# Patient Record
Sex: FEMALE | Race: BLACK OR AFRICAN AMERICAN | NOT HISPANIC OR LATINO | Employment: STUDENT | URBAN - METROPOLITAN AREA
[De-identification: names, ages, dates, MRNs, and addresses within clinical notes are randomized per-mention and may not be internally consistent; named-entity substitution may affect disease eponyms.]

---

## 2024-07-10 ENCOUNTER — HOSPITAL ENCOUNTER (EMERGENCY)
Facility: HOSPITAL | Age: 19
Discharge: HOME | End: 2024-07-10

## 2024-07-10 VITALS
DIASTOLIC BLOOD PRESSURE: 65 MMHG | WEIGHT: 95 LBS | OXYGEN SATURATION: 99 % | HEART RATE: 80 BPM | RESPIRATION RATE: 16 BRPM | SYSTOLIC BLOOD PRESSURE: 110 MMHG

## 2024-07-10 DIAGNOSIS — R04.0 EPISTAXIS: Primary | ICD-10-CM

## 2024-07-10 PROCEDURE — 99282 EMERGENCY DEPT VISIT SF MDM: CPT

## 2024-07-10 PROCEDURE — 2500000001 HC RX 250 WO HCPCS SELF ADMINISTERED DRUGS (ALT 637 FOR MEDICARE OP): Performed by: PHYSICIAN ASSISTANT

## 2024-07-10 RX ORDER — OXYMETAZOLINE HCL 0.05 %
2 SPRAY, NON-AEROSOL (ML) NASAL ONCE
Status: COMPLETED | OUTPATIENT
Start: 2024-07-10 | End: 2024-07-10

## 2024-07-10 RX ORDER — OXYMETAZOLINE HCL 0.05 %
2 SPRAY, NON-AEROSOL (ML) NASAL EVERY 12 HOURS PRN
Qty: 30 ML | Refills: 0 | Status: SHIPPED | OUTPATIENT
Start: 2024-07-10 | End: 2024-07-13

## 2024-07-10 ASSESSMENT — PAIN - FUNCTIONAL ASSESSMENT: PAIN_FUNCTIONAL_ASSESSMENT: 0-10

## 2024-07-10 ASSESSMENT — COLUMBIA-SUICIDE SEVERITY RATING SCALE - C-SSRS
6. HAVE YOU EVER DONE ANYTHING, STARTED TO DO ANYTHING, OR PREPARED TO DO ANYTHING TO END YOUR LIFE?: NO
1. IN THE PAST MONTH, HAVE YOU WISHED YOU WERE DEAD OR WISHED YOU COULD GO TO SLEEP AND NOT WAKE UP?: NO
2. HAVE YOU ACTUALLY HAD ANY THOUGHTS OF KILLING YOURSELF?: NO

## 2024-07-10 ASSESSMENT — PAIN SCALES - GENERAL: PAINLEVEL_OUTOF10: 0 - NO PAIN

## 2024-07-10 NOTE — ED PROVIDER NOTES
HPI   Chief Complaint   Patient presents with    Epistaxis (Nose Bleed)       18-year-old female with bleeding left nares began less than 1 hour ago.  Has nasal clip on with no active bleeding on my initial evaluation.  No trauma or injury.  She feels fine otherwise.  No routine medication.  No allergies.      History provided by:  Patient   used: No                        No data recorded                   Patient History   No past medical history on file.  No past surgical history on file.  No family history on file.  Social History     Tobacco Use    Smoking status: Not on file    Smokeless tobacco: Not on file   Substance Use Topics    Alcohol use: Not on file    Drug use: Not on file       Physical Exam   ED Triage Vitals [07/10/24 1839]   Temp Heart Rate Respirations BP   -- 81 16 110/65      Pulse Ox Temp src Heart Rate Source Patient Position   100 % -- Monitor --      BP Location FiO2 (%)     -- --       Physical Exam  Vitals and nursing note reviewed.   Constitutional:       General: She is not in acute distress.     Appearance: Normal appearance. She is normal weight. She is not ill-appearing, toxic-appearing or diaphoretic.   HENT:      Head: Normocephalic and atraumatic.      Nose: Nose normal.      Comments: No active bleeding on initial eval after nasal clip and been applied in triage.  Does have some old blood left nares.  Posterior pharynx clear with no pharyngeal bleeding.  Right nares normal.     Mouth/Throat:      Mouth: Mucous membranes are moist.      Pharynx: Oropharynx is clear.   Eyes:      Extraocular Movements: Extraocular movements intact.      Conjunctiva/sclera: Conjunctivae normal.      Pupils: Pupils are equal, round, and reactive to light.   Cardiovascular:      Rate and Rhythm: Normal rate.   Pulmonary:      Effort: Pulmonary effort is normal.   Musculoskeletal:         General: Normal range of motion.   Skin:     General: Skin is warm and dry.   Neurological:       General: No focal deficit present.      Mental Status: She is alert and oriented to person, place, and time.   Psychiatric:         Mood and Affect: Mood normal.         Behavior: Behavior normal.         Thought Content: Thought content normal.         Judgment: Judgment normal.         ED Course & MDM   Diagnoses as of 07/10/24 2019   Epistaxis       Medical Decision Making  Afrin nasal spray  2015: Bleeding resolved.  Nares patent.  No bleeding.  Posterior pharynx clear no bleeding.    Anterior epistaxis resolved.    Stable for discharge outpatient management follow-up.  Use of saline and Afrin nasal spray.  Direct pressure bleeding recurs.  Return to ER as needed.        Procedure  Procedures     Walter Figueroa PA-C  07/10/24 3786

## 2024-07-10 NOTE — ED TRIAGE NOTES
PT PRESENTS TO ED WITH NOSEBLEED THAT STARTED 20  MINUTES AGO. PT DENIES DIZZINESS. PT HAS A HX OF NOSEBLEEDS. PT DENIES USE OF BLOOD THINNERS. NOSE CLAMP APPLIED IN TRIAGE.

## 2024-07-18 ENCOUNTER — APPOINTMENT (OUTPATIENT)
Dept: OBSTETRICS AND GYNECOLOGY | Facility: CLINIC | Age: 19
End: 2024-07-18
Payer: COMMERCIAL

## 2024-08-05 ENCOUNTER — APPOINTMENT (OUTPATIENT)
Dept: OBSTETRICS AND GYNECOLOGY | Facility: CLINIC | Age: 19
End: 2024-08-05
Payer: COMMERCIAL

## 2024-08-05 ENCOUNTER — LAB (OUTPATIENT)
Dept: LAB | Facility: LAB | Age: 19
End: 2024-08-05

## 2024-08-05 VITALS
WEIGHT: 126 LBS | HEIGHT: 64 IN | BODY MASS INDEX: 21.51 KG/M2 | DIASTOLIC BLOOD PRESSURE: 62 MMHG | SYSTOLIC BLOOD PRESSURE: 100 MMHG

## 2024-08-05 DIAGNOSIS — Z32.01 PREGNANCY TEST POSITIVE (HHS-HCC): ICD-10-CM

## 2024-08-05 DIAGNOSIS — Z34.01 ENCOUNTER FOR SUPERVISION OF NORMAL FIRST PREGNANCY, FIRST TRIMESTER (HHS-HCC): Primary | ICD-10-CM

## 2024-08-05 DIAGNOSIS — Z3A.11 11 WEEKS GESTATION OF PREGNANCY (HHS-HCC): ICD-10-CM

## 2024-08-05 DIAGNOSIS — Z34.01 ENCOUNTER FOR SUPERVISION OF NORMAL FIRST PREGNANCY, FIRST TRIMESTER (HHS-HCC): ICD-10-CM

## 2024-08-05 LAB
ERYTHROCYTE [DISTWIDTH] IN BLOOD BY AUTOMATED COUNT: 12.8 % (ref 11.5–14.5)
HBV SURFACE AG SERPL QL IA: NONREACTIVE
HCT VFR BLD AUTO: 39.3 % (ref 36–46)
HCV AB SER QL: NONREACTIVE
HGB BLD-MCNC: 12.2 G/DL (ref 12–16)
HIV 1+2 AB+HIV1 P24 AG SERPL QL IA: NONREACTIVE
MCH RBC QN AUTO: 25.7 PG (ref 26–34)
MCHC RBC AUTO-ENTMCNC: 31 G/DL (ref 32–36)
MCV RBC AUTO: 83 FL (ref 80–100)
NRBC BLD-RTO: 0 /100 WBCS (ref 0–0)
PLATELET # BLD AUTO: 316 X10*3/UL (ref 150–450)
PREGNANCY TEST URINE, POC: POSITIVE
RBC # BLD AUTO: 4.74 X10*6/UL (ref 4–5.2)
REFLEX ADDED, ANEMIA PANEL: NORMAL
RUBV IGG SERPL IA-ACNC: 0.6 IA
RUBV IGG SERPL QL IA: NEGATIVE
TREPONEMA PALLIDUM IGG+IGM AB [PRESENCE] IN SERUM OR PLASMA BY IMMUNOASSAY: NONREACTIVE
VARICELLA ZOSTER IGG INDEX: 0.5 IA
VZV IGG SER QL IA: NEGATIVE
WBC # BLD AUTO: 10.9 X10*3/UL (ref 4.4–11.3)

## 2024-08-05 PROCEDURE — 87491 CHLMYD TRACH DNA AMP PROBE: CPT

## 2024-08-05 PROCEDURE — 85027 COMPLETE CBC AUTOMATED: CPT

## 2024-08-05 PROCEDURE — 83021 HEMOGLOBIN CHROMOTOGRAPHY: CPT

## 2024-08-05 PROCEDURE — 86317 IMMUNOASSAY INFECTIOUS AGENT: CPT

## 2024-08-05 PROCEDURE — 86787 VARICELLA-ZOSTER ANTIBODY: CPT

## 2024-08-05 PROCEDURE — 0500F INITIAL PRENATAL CARE VISIT: CPT

## 2024-08-05 PROCEDURE — 81025 URINE PREGNANCY TEST: CPT

## 2024-08-05 PROCEDURE — 86803 HEPATITIS C AB TEST: CPT

## 2024-08-05 PROCEDURE — 87340 HEPATITIS B SURFACE AG IA: CPT

## 2024-08-05 PROCEDURE — 87389 HIV-1 AG W/HIV-1&-2 AB AG IA: CPT

## 2024-08-05 PROCEDURE — 87591 N.GONORRHOEAE DNA AMP PROB: CPT

## 2024-08-05 PROCEDURE — 36415 COLL VENOUS BLD VENIPUNCTURE: CPT

## 2024-08-05 PROCEDURE — 83020 HEMOGLOBIN ELECTROPHORESIS: CPT

## 2024-08-05 PROCEDURE — 86780 TREPONEMA PALLIDUM: CPT

## 2024-08-05 PROCEDURE — 87086 URINE CULTURE/COLONY COUNT: CPT

## 2024-08-05 RX ORDER — NAPROXEN SODIUM 220 MG/1
162 TABLET, FILM COATED ORAL DAILY
Qty: 60 TABLET | Refills: 3 | Status: SHIPPED | OUTPATIENT
Start: 2024-08-05 | End: 2024-11-03

## 2024-08-05 ASSESSMENT — ENCOUNTER SYMPTOMS
HEMATOLOGIC/LYMPHATIC NEGATIVE: 0
CARDIOVASCULAR NEGATIVE: 0
RESPIRATORY NEGATIVE: 0
EYES NEGATIVE: 0
MUSCULOSKELETAL NEGATIVE: 0
ALLERGIC/IMMUNOLOGIC NEGATIVE: 0
PSYCHIATRIC NEGATIVE: 0
CONSTITUTIONAL NEGATIVE: 0
NEUROLOGICAL NEGATIVE: 0
GASTROINTESTINAL NEGATIVE: 0
ENDOCRINE NEGATIVE: 0

## 2024-08-05 ASSESSMENT — EDINBURGH POSTNATAL DEPRESSION SCALE (EPDS)
I HAVE FELT SCARED OR PANICKY FOR NO GOOD REASON: NO, NOT AT ALL
I HAVE LOOKED FORWARD WITH ENJOYMENT TO THINGS: AS MUCH AS I EVER DID
I HAVE BEEN SO UNHAPPY THAT I HAVE BEEN CRYING: NO, NEVER
THINGS HAVE BEEN GETTING ON TOP OF ME: NO, I HAVE BEEN COPING AS WELL AS EVER
I HAVE BLAMED MYSELF UNNECESSARILY WHEN THINGS WENT WRONG: NO, NEVER
I HAVE BEEN SO UNHAPPY THAT I HAVE HAD DIFFICULTY SLEEPING: NOT AT ALL
I HAVE FELT SAD OR MISERABLE: NO, NOT AT ALL
I HAVE BEEN ANXIOUS OR WORRIED FOR NO GOOD REASON: NO, NOT AT ALL
TOTAL SCORE: 0
THE THOUGHT OF HARMING MYSELF HAS OCCURRED TO ME: NEVER
I HAVE BEEN ABLE TO LAUGH AND SEE THE FUNNY SIDE OF THINGS: AS MUCH AS I ALWAYS COULD

## 2024-08-05 NOTE — PROGRESS NOTES
"Wesley Sarkar is a 19 y.o.  at 40iia7l with a working estimated date of delivery of 24. who presents for an initial prenatal visit. This pregnancy is unplanned.    Patient currently experiencing: nausea, desires anti-emetics- patient reports that it's gone away.   Bleeding or cramping since LMP: yes - patient reports occasional spotting with wiping.   Taking prenatal vitamin: Yes  Other concerns today:  Ultrasound completed this pregnancy: Yes - \"by a lady, she didn't tell me anything\"- patient can not recall where the ultrasound was performed.   Last pap: NA  History of hypertension:  No    Patient has trichomonas, patient was told by other provider not to take flagyl-encouraged patient to continue course at this time.   OB History    Para Term  AB Living   1             SAB IAB Ectopic Multiple Live Births                  # Outcome Date GA Lbr Mal/2nd Weight Sex Type Anes PTL Lv   1 Current                History reviewed. No pertinent past medical history.   Past Surgical History:   Procedure Laterality Date    HERNIA REPAIR        Social History     Socioeconomic History    Marital status: Single   Tobacco Use    Smoking status: Never    Smokeless tobacco: Never   Vaping Use    Vaping status: Never Used   Substance and Sexual Activity    Alcohol use: Not Currently    Drug use: Never    Sexual activity: Not Currently       Objective   Physical Exam  Weight: 57.2 kg (126 lb)  TW kg (0 lb)   Pregravid BMI: 21.78  BP: 100/62    General:   Alert and oriented x 3   Heart:  Lungs: Regular rate, rhythm  Clear to auscultation bilaterally   Thyroid: Euthyroid, normal shape and size   Breast: Symmetrical, no skin changes/nipple discharge, redness, tenderness, no masses palpated bilaterally   Abdomen: Soft, non tender   Vulva: EGBUS normal, gravid 10 weeks.    Vagina: Pink, normal discharge   Cervix: No CMT   Uterus: Normal shape, size   Adnexa: NT bilaterally     Assessment "   Problem List Items Addressed This Visit    None  Visit Diagnoses       Encounter for supervision of normal first pregnancy, first trimester ([facility])    -  Primary    Relevant Medications    prenatal vitamin, iron-folic, 27 mg iron-800 mcg folic acid tablet    aspirin 81 mg chewable tablet    Other Relevant Orders    [facility] OB imaging order    CBC Anemia Panel With Reflex,Pregnancy (Completed)    HEMOGLOBIN IDENTIFICATION WITH PATH REVIEW (Completed)    Hepatitis B surface antigen (Completed)    Hepatitis C antibody (Completed)    Rubella Antibody, IgG (Completed)    Syphilis Screen with Reflex (Completed)    HIV 1/2 Antigen/Antibody Screen with Reflex to Confirmation (Completed)    Varicella Zoster Antibody, IgG (Completed)    Urine Culture (Completed)    C. Trachomatis / N. Gonorrhoeae, Amplified Detection (Completed)    Pregnancy test positive ([facility])        Relevant Orders    POCT pregnancy, urine manually resulted (Completed)    11 weeks gestation of pregnancy ([facility])        Relevant Medications    prenatal vitamin, iron-folic, 27 mg iron-800 mcg folic acid tablet    aspirin 81 mg chewable tablet    Other Relevant Orders    [facility] Prenatal Screen             Plan   - New OB resources provided and reviewed with particular attention to dietary, travel, and medication restrictions  - Oriented to practice, CNM vs. MD care  - Reviewed IOM recommendations for weight gain given pt's BMI: 25-35 pounds (BMI 18.5 - 24.9)  - Reviewed bleeding precautions, warning signs, when to call provider; phone number provided  - Discussed bASA for PEC prophylaxis  - Routine NOB labs ordered  - NIPT discussed with patient: patient desires. Pre-test genetic counseling discussed and included:   Interpretation of family and medical histories to assess the probability of disease occurrence or recurrence;   Education about inheritance, genetic testing, disease management, prevention and resources  Counseling to promote  informed choices and adaptation to the risk or presented of a genetic condition  Counseling for psychological aspects of genetic testing.  Plan for trichomonas test of cure at next prenatal visit.   - Discussed Centering Pregnancy with patient, declines at this time  - Nuchal Translucency ultrasound ordered  - Return in 4 weeks for routine prenatal care    EDIN Fung-MELANIE    Things to know in Pregnancy  1.     Avoidance of alcohol, tobacco and drug use  2.     Dietary restrictions reviewed including avoiding raw or poorly cooked meat, lunch meat and soft cheeses  3.    Adequate water intake.  Avoid empty calories with juices  4.    Recommendation for weight gain based on initial BMI (body mass index)  5.    Limit caffeine to less than 200-300 mg/day  6.    Take folic acid 400 mcg daily.  Incorporate 5,000u Vitamin D3 per day.  Discuss Magnesium Supplementation  7.    Importance of good sleep hygiene and avoidance of laying on back after 15 weeks  8.    Encourage daily physical activity of 30 minutes a day the majority of the days of the week  9.    Discussed normal physiologic changes:  Round ligament pain, nausea, breast tenderness  10.  Discussed natural remedies, vitamins and prescription medications for nausea  11.  Baby aspirin 162mg daily (two baby aspirin) for the reduction of pre-eclampsia during pregnancy.  Even if you have never had any blood pressure issues, you can develop hypertension during your pregnancy.  This has been well  Studied and safe to take starting at 12 weeks gestation until after the birth of your baby.    Ultrasound and screening for aneuploidies (Down Syndrome/Trisomy 21, Trisomy 13 + 18)    A requisition has been placed for a dating ultrasound.  Please call to get that scheduled.    At this ultrasound they will ask you if your would like to proceed with screening for genetic disorders that are listed above.  They will do that with an ultrasound at approximately 13 weeks and we  also draw blood work for screening which includes the fetal sex if you desire to know.    Ultrasound for anatomy will be done at 19 weeks.  Based on risk factors and any concerns the maternal fetal medicine provider has, you may need a repeat ultrasound.  Healthy pregnancies that do not have any other concerns by the midwife or maternal fetal medicine do not have any repeat ultrasounds done.      Labs:    1.     An order will be placed for your new ob labs.  Please get those done at the time of your ultrasound.  They will collect         multiple tubes of blood for new ob labs and also urine for STI testing and a urine culture.    2.     If there are any concerns with any blood work or urine testing WE WILL CALL YOU OR COMMUNICATE VIA         [facility]!!!   The biggest concerns our patients have is when they see their complete blood count.  The reference         range in the result is for a non pregnant person!  We will notify you if there is any need to start an iron supplement.    3.    At 26-28 weeks a glucose test is ordered to see if you have gestational diabetes.  We also reassess if you have         Anemia, which can be common in pregnancy    4.    Group B strep culture will be done at 36 weeks gestation.      We also recommend that you be screened once in your life for Cystic Fibrosis and Spinal Muscular Atrophy.  This assesses if we need your partner to be tested if you are a carrier of a gene that can be passed along to your baby.  For this to happen, both parents must be a carrier to the gene.    Choices for care and hospital for birth:    I am a Certified Nurse Midwife and practice in a group setting, which means that any of the midwives in my group practice may be there for your birth.  We care for healthy females during pregnancy and labor/birth.  We practice in collaboration with physicians within our group.  If there are any concerns with your pregnancy, labor or birth our physicians work closely with  "us.      The midwives in our practice strongly encourage you to explore the option of Centering Pregnancy which has been studied for better birth outcomes!  Care will be done in a group setting with 1:1 time with a midwife and then in depth education about every stage of your pregnancy, labor/birth,  care, feeding choices, pediatrician options, birth control and coping techniques for the first few weeks after birth.  We have day groups and our [place] location and a Monday evening group at [facility].  The groups follow your normal prenatal schedule and yes, we keep in contact and I see you at the end of your pregnancy.    There are certain medical conditions that \"risks you out\" of midwifery care that we are constantly assessing for.  Some conditions during your pregnancy that would risk you out of midwifery care would be:    1.     Severe growth restriction of your baby  2.     Labor/Birth  less than 35 weeks  3.     Severe pre-eclampsia at any time during your pregnancy/labor/birth  4.     Gestational Diabetes needing medication (insulin) to control your blood sugars  5.     If you decline or do not complete your glucose testing to rule out diet controlled diabetes by 32 weeks  6.     If you are diagnosed with chronic hypertension during your pregnancy and need to start medication    The options for birth where we provide  Certified Nurse Midwifery coverage with a board certified OB/GYN, in house anesthesia and neonataology coverage are:    [facility] at [place], [place]    To call for questions regarding your care of if you are in labor is [phone]    My nurses name is Sofi Rider who can answer questions and keep me updated should any questions or concerns arise during your pregnancy.  After hours, the answering service will ask you where you intended to give birth and connect you to the midwife on call " "at [facility] or the [facility] at [facility].    If you would like to tour either facility:  Please call the Childbirth education line at [phone]      Danger signs to report:    1.    Seek medical care immediately if you have pain that is doubling you over or vaginal bleeding that is heavier than a  period  2.    Notify the office should you have any burning, urgency, frequency of urination or other concerning symptoms.    Medications that are safe for common discomforts of pregnancy:    1.     Tylenol  2.     Tums or Papaya extract for any upset stomach or heartburn  3.     Zyrtec, Claritin, Benadryl for allergy symptoms  4.     Sudafed or Robitussin for cold symptoms    Work restrictions:    A normal healthy pregnancy without any complications are able to have the standard pregnancy work restrictions which is no pushing/pulling/lifting greater than 25 pounds independently    FMLA paperwork    Can be brought to the office for us to fill out for when you are starting your maternity leave (either your scheduled date of going to the hospital or your due date).  We cannot give out early FMLA when there is no documented medical conditions that are considered \"normal pregnancy\" events.    Comfort measures    1.     Chiropractors are great for alleviation of ligament pain  2.     Yoga is good for your ligaments and mentally preparing for baby and labor.  A prenatal yoga class is recommended.  3.     Prenatal massages are fine  4.     A maternity support belt is an amazing thing that can help ligament pain -- can be purchased on Amazon  5.     Good supportive shoes are key to helping with ligament pain    Dental care    It is very important to see a dentist during your pregnancy for routine cleaning and also if you develop any dental pain during your pregnancy.  Healthy gums and teeth are very important during your pregnancy.  We can provide you with a dental letter if your dentist would like " one.      **IF AT ANYTIME DURING YOUR PREGNANCY YOU HAVE CONCERNS THAT YOU CANNOT AFFORD HEALTHY FOOD PLEASE LET US KNOW!**  We have a Food for Life program and would be happy to place a referral for you.  It is so important to eat healthy during your pregnancy and we treat food as medicine.  Healthy food is expensive!  This program will allow you and your family up to 4 to receive food and recipes for one week per month.  This needs to be renewed every 6 months.      Thank you for choosing our practice and [facility] for you healthcare!  I am excited to partner with you during this very special time.     If there is anything I can do to help make your experience is positive, please come to your visits with questions and concerns and do not be afraid to ask what is on your mind!  We will see you in the office every 4 weeks until you are 30 weeks, then every 2 weeks until 36 weeks and then weekly until your baby is born.      What is a Midwife?    Midwives in the [place] provide health care services to women of all ages.Midwifemeans “with woman.”  Midwives partner with women to help make important health decisions. Midwives work with other members  of a woman’s health care team when needed, but a midwife may also be your primary care provider. There are  3 main types of midwives, and there are some differences in the services offered by each type of midwife. At [facility], our practice consists of Certified-Nurse Midwives.    Certified Nurse-Midwives (CNMs)  Have a college degree in nursing and a master’s degree in nurse-midwifery. CNMs are registered nurses (RNs)  who have graduated from an accredited nurse-midwifery education programand passed a national certification  exam. They must have a license to practice midwifery in the state where they work. CNMs work in all health  care settings including hospitals, birth centers, and offices or clinics. CNMs provide general women’s health  care  throughout a woman’s lifetime, and they can prescribe most medications.    What do midwives do?  Midwives provide care to women during pregnancy, labor, birth, and the postpartum period. They also provide general health services, annual checkups, contraception (birth control), menopausal care, and treatment for common infections and health problems. Midwives care for about one of every 10 women who give birth each year in the [place]. Most of these births are in hospitals.     Why would I choose a midwife for care during my pregnancy?  Midwives view pregnancy and birth as normal life events that can be a healthy time in your life. Midwives are experts in knowing the difference between normal changes that occur and symptoms that require extra attention. Midwives specialize in providing support and education in addition to regular health care. They use evidence-based medical procedures when there is a specific concern for the health of you or your baby, and they work in partnership with physicians who can be available if needed. Midwives offer health care that respects the goals and choices of each individual woman and family.    What if I have a high-risk pregnancy or complication during labor?  Your CNM can prescribe medicine, order tests, and provide treatment for common illnesses that you might get during pregnancy. Midwives work with physicians who specialize in complications of pregnancy. If you have a medical problem during pregnancy or complication during labor, your midwife will work with a physician to make sure you get the best and safest care for you and your baby. Midwives do not perform surgery. If you need to have a  birth, the surgery will be done by the physician who works with your midwife. Your midwife will also work with other health care providers: nurses, social workers, nutritionists, doulas, childbirth educators, physical therapists, and other specialists to help you get the  care you need.    What if I want pain medicine during labor?  Your midwife will help you make decisions about how you are going to cope with your labor. If you want medicine to cope with pain during labor, your midwife can help you get medicine that is available in the setting  where you give birth. Midwives also know other ways help women cope with labor such as changing positions or being in a tub of water. These can be helpful in addition to pain medications.

## 2024-08-06 LAB
BACTERIA UR CULT: NORMAL
C TRACH RRNA SPEC QL NAA+PROBE: NEGATIVE
HEMOGLOBIN A2: 3 % (ref 2–3.5)
HEMOGLOBIN A: 96.7 % (ref 95.8–98)
HEMOGLOBIN F: 0.3 % (ref 0–2)
HEMOGLOBIN IDENTIFICATION INTERPRETATION: NORMAL
N GONORRHOEA DNA SPEC QL PROBE+SIG AMP: NEGATIVE
PATH REVIEW-HGB IDENTIFICATION: NORMAL

## 2024-08-07 PROBLEM — O23.591 TRICHOMONAL VAGINITIS DURING PREGNANCY IN FIRST TRIMESTER (HHS-HCC): Status: ACTIVE | Noted: 2024-08-07

## 2024-08-07 PROBLEM — A59.01 TRICHOMONAL VAGINITIS DURING PREGNANCY IN FIRST TRIMESTER (HHS-HCC): Status: ACTIVE | Noted: 2024-08-07

## 2024-08-12 ENCOUNTER — APPOINTMENT (OUTPATIENT)
Dept: RADIOLOGY | Facility: CLINIC | Age: 19
End: 2024-08-12

## 2024-08-19 LAB — SCAN RESULT: NORMAL

## 2024-10-23 ENCOUNTER — APPOINTMENT (OUTPATIENT)
Dept: OBSTETRICS AND GYNECOLOGY | Facility: CLINIC | Age: 19
End: 2024-10-23